# Patient Record
Sex: MALE | Race: WHITE | Employment: FULL TIME | URBAN - METROPOLITAN AREA
[De-identification: names, ages, dates, MRNs, and addresses within clinical notes are randomized per-mention and may not be internally consistent; named-entity substitution may affect disease eponyms.]

---

## 2017-12-12 ENCOUNTER — OFFICE VISIT (OUTPATIENT)
Dept: OPHTHALMOLOGY | Facility: CLINIC | Age: 62
End: 2017-12-12
Attending: OPHTHALMOLOGY
Payer: COMMERCIAL

## 2017-12-12 DIAGNOSIS — H52.11 HIGH MYOPIA, RIGHT: Primary | ICD-10-CM

## 2017-12-12 DIAGNOSIS — H44.522 PHTHISIS BULBI, LEFT EYE: ICD-10-CM

## 2017-12-12 DIAGNOSIS — H35.411 LATTICE DEGENERATION OF PERIPHERAL RETINA, RIGHT: ICD-10-CM

## 2017-12-12 PROCEDURE — 99213 OFFICE O/P EST LOW 20 MIN: CPT | Mod: ZF

## 2017-12-12 PROCEDURE — 92015 DETERMINE REFRACTIVE STATE: CPT | Mod: 52,ZF

## 2017-12-12 ASSESSMENT — REFRACTION_MANIFEST
OD_CYLINDER: +1.00
OD_SPHERE: -10.00
OD_ADD: +2.50
OS_SPHERE: BALANCE
OD_AXIS: 005

## 2017-12-12 ASSESSMENT — SLIT LAMP EXAM - LIDS
COMMENTS: NORMAL
COMMENTS: GOOD LID POSITION

## 2017-12-12 ASSESSMENT — VISUAL ACUITY
CORRECTION_TYPE: GLASSES
OD_CC: 20/20
OS_CC: PROS
OD_CC+: +2
METHOD: SNELLEN - LINEAR

## 2017-12-12 ASSESSMENT — TONOMETRY
IOP_METHOD: TONOPEN
OS_IOP_MMHG: PROS
OD_IOP_MMHG: 16

## 2017-12-12 ASSESSMENT — REFRACTION_WEARINGRX
OS_AXIS: 007
SPECS_TYPE: PAL
OS_CYLINDER: +0.75
OD_AXIS: 176
OD_CYLINDER: +0.75
OD_SPHERE: -9.75
OS_SPHERE: -9.50
OD_ADD: +2.50
OS_ADD: +2.50

## 2017-12-12 ASSESSMENT — EXTERNAL EXAM - RIGHT EYE: OD_EXAM: BROW PTOSIS

## 2017-12-12 ASSESSMENT — CONF VISUAL FIELD
OS_INFERIOR_NASAL_RESTRICTION: 1
OS_SUPERIOR_NASAL_RESTRICTION: 1
OS_INFERIOR_TEMPORAL_RESTRICTION: 1
OD_NORMAL: 1
OS_SUPERIOR_TEMPORAL_RESTRICTION: 1

## 2017-12-12 ASSESSMENT — CUP TO DISC RATIO: OD_RATIO: 0.4

## 2017-12-12 ASSESSMENT — EXTERNAL EXAM - LEFT EYE: OS_EXAM: ENOPHTHALMOS
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
## 2017-12-12 NOTE — MR AVS SNAPSHOT
After Visit Summary   12/12/2017    Elliott Caruso    MRN: [id number]           Patient Information     Date Of Birth          1955        Visit Information        Provider Department      12/12/2017 1:30 PM T[facility]quez [facility]        Today's Diagnoses     High myopia, right    -  1    Lattice degeneration of peripheral retina, right        Phthisis bulbi, left eye           Follow-ups after your visit        Follow-up notes from your care team     Return in about 1 year (around 12/12/2018) for DFE.      Who to contact     Please call your clinic at [phone] to:    Ask questions about your health    Make or cancel appointments    Discuss your medicines    Learn about your test results    Speak to your doctor   If you have compliments or concerns about an experience at your clinic, or if you wish to file a complaint, please contact [facility] Patient Relations at [phone] or email us at [email or web address]         Additional Information About Your Visit        MyChart Information     [employer] gives you secure access to your electronic health record. If you see a primary care provider, you can also send messages to your care team and make appointments. If you have questions, please call your primary care clinic.  If you do not have a primary care provider, please call [phone] and they will assist you.      [employer] is an electronic gateway that provides easy, online access to your medical records. With [employer], you can request a clinic appointment, read your test results, renew a prescription or communicate with your care team.     To access your existing account, please contact your [facility] or call [phone] for assistance.        Care EveryWhere ID     This is your Care EveryWhere ID. This could be used by other organizations to access your [facility] medical records  [id number]          Blood Pressure from Last 3 Encounters:   No data found for BP    Weight from Last 3 Encounters:   No data found for Wt              Today, you had the following     No orders found for display       Primary Care Provider Office Phone # Fax #    Rajiv Mcdonald [phone]       [facility] [place] [address] [place] [address]        Equal Access to Services     [facility]: Hadii aad ku hadasho Soomaali, waaxda luqadaha, qaybta kaalmada adeegyada, waxay idiin hayaan adeeg kharash la'aan . So [facility] [phone].    ATENCIÓN: Si habla español, tiene a momin disposición servicios gratuitos de asistencia lingüística. Llame al [phone].    We comply with applicable federal civil rights laws and [place] laws. We do not discriminate on the basis of race, color, national origin, age, disability, sex, sexual orientation, or gender identity.            Thank you!     Thank you for choosing [facility]  for your care. Our goal is always to provide you with excellent care. Hearing back from our patients is one way we can continue to improve our services. Please take a few minutes to complete the written survey that you may receive in the mail after your visit with us. Thank you!             Your Updated Medication List - Protect others around you: Learn how to safely use, store and throw away your medicines at www.disposemymeds.org.          This list is accurate as of: 12/12/17  3:18 PM.  Always use your most recent med list.                   Brand Name Dispense Instructions for use Diagnosis    INDOMETHACIN PO      Take by mouth as needed  for Gout

## 2017-12-12 NOTE — NURSING NOTE
"Chief Complaints and History of Present Illnesses   Patient presents with     Follow Up For     annual eye exam; high myopia, right eye; prosthetic left eye     HPI    Affected eye(s):  Right   Symptoms:     No decreased vision   Floaters   No flashes (Comment: only sporadically noted; stable)      Duration:  1 year   Frequency:  Constant       Do you have eye pain now?:  No      Comments:  Annual f/u high myopia for the right eye; prosthetic left eye - has prosthesis out for the exam  Pt states vision stable since last year    Pt occasionally uses an equate eye drop (believes the ones that \"get the red out\")    Sindy Lester COA 1:54 PM December 12, 2017              "

## 2018-12-11 ENCOUNTER — OFFICE VISIT (OUTPATIENT)
Dept: OPHTHALMOLOGY | Facility: CLINIC | Age: 63
End: 2018-12-11
Attending: OPHTHALMOLOGY
Payer: COMMERCIAL

## 2018-12-11 DIAGNOSIS — H52.11 HIGH MYOPIA, RIGHT: Primary | ICD-10-CM

## 2018-12-11 DIAGNOSIS — H44.522 PHTHISIS BULBI, LEFT EYE: ICD-10-CM

## 2018-12-11 DIAGNOSIS — H35.411 LATTICE DEGENERATION OF PERIPHERAL RETINA, RIGHT: ICD-10-CM

## 2018-12-11 PROCEDURE — G0463 HOSPITAL OUTPT CLINIC VISIT: HCPCS | Mod: ZF

## 2018-12-11 ASSESSMENT — REFRACTION_WEARINGRX
SPECS_TYPE: PAL
OD_CYLINDER: +0.75
OD_AXIS: 176
OD_ADD: +2.50
OS_AXIS: 007
OS_CYLINDER: +0.75
OS_SPHERE: -9.50
OS_ADD: +2.50
OD_SPHERE: -9.75

## 2018-12-11 ASSESSMENT — VISUAL ACUITY
OD_CC: 20/20
METHOD: SNELLEN - LINEAR
METHOD_MR: PT. DEFERS
CORRECTION_TYPE: GLASSES
OD_CC+: +2
OS_CC: PROS.

## 2018-12-11 ASSESSMENT — EXTERNAL EXAM - RIGHT EYE: OD_EXAM: BROW PTOSIS

## 2018-12-11 ASSESSMENT — CONF VISUAL FIELD
OS_SUPERIOR_TEMPORAL_RESTRICTION: 1
OD_NORMAL: 1
OS_INFERIOR_NASAL_RESTRICTION: 1
OS_SUPERIOR_NASAL_RESTRICTION: 1
OS_INFERIOR_TEMPORAL_RESTRICTION: 1

## 2018-12-11 ASSESSMENT — TONOMETRY
OS_IOP_MMHG: PROS
IOP_METHOD: TONOPEN
OD_IOP_MMHG: 15

## 2018-12-11 ASSESSMENT — SLIT LAMP EXAM - LIDS
COMMENTS: NORMAL
COMMENTS: GOOD LID POSITION

## 2018-12-11 ASSESSMENT — REFRACTION_MANIFEST: OS_SPHERE: BALANCE

## 2018-12-11 ASSESSMENT — EXTERNAL EXAM - LEFT EYE: OS_EXAM: ENOPHTHALMOS

## 2018-12-11 ASSESSMENT — CUP TO DISC RATIO: OD_RATIO: 0.4

## 2018-12-11 NOTE — NURSING NOTE
Chief Complaints and History of Present Illnesses   Patient presents with     Annual Eye Exam

## 2018-12-11 NOTE — PROGRESS NOTES
Chief Complaint(s) and History of Present Illness(es)     Pt. States that he is doing well.  No change in VA RE.  No pain BE.  Macy Bradley COT 1:10 PM December 11, 2018         Review of systems for the eyes was negative other than the pertinent positives/negatives listed in the HPI.      Assessment & Plan      Elliott Caruso is a 63 year old male with the following diagnoses:   1. High myopia, right    2. Lattice degeneration of peripheral retina, right    3. Phthisis bulbi, left eye         Doing well, no change in vision   Stable/healthy dilated fundus exam right eye   Discussed symptoms of retinal tear/detachment and the need to be seen urgently should they occur     Prosthesis comfortable, orbit looks good  Monocular precautions reviewed      Patient disposition:   Return in about 1 year (around 12/11/2019) for DFE.          Attending Physician Attestation:  Complete documentation of historical and exam elements from today's encounter can be found in the full encounter summary report (not reduplicated in this progress note).  I personally obtained the chief complaint(s) and history of present illness.  I confirmed and edited as necessary the review of systems, past medical/surgical history, family history, social history, and examination findings as documented by others; and I examined the patient myself.  I personally reviewed the relevant tests, images, and reports as documented above.  I formulated and edited as necessary the assessment and plan and discussed the findings and management plan with the patient and family. . - Tylor Henriquez MD

## 2019-10-05 ENCOUNTER — HEALTH MAINTENANCE LETTER (OUTPATIENT)
Age: 64
End: 2019-10-05

## 2020-02-10 ENCOUNTER — HEALTH MAINTENANCE LETTER (OUTPATIENT)
Age: 65
End: 2020-02-10

## 2020-10-01 ENCOUNTER — OFFICE VISIT (OUTPATIENT)
Dept: OPHTHALMOLOGY | Facility: CLINIC | Age: 65
End: 2020-10-01
Attending: OPHTHALMOLOGY
Payer: COMMERCIAL

## 2020-10-01 DIAGNOSIS — H52.11 HIGH MYOPIA, RIGHT: ICD-10-CM

## 2020-10-01 DIAGNOSIS — H25.11 SENILE NUCLEAR SCLEROSIS, RIGHT: ICD-10-CM

## 2020-10-01 DIAGNOSIS — H44.522 PHTHISIS BULBI, LEFT: Primary | ICD-10-CM

## 2020-10-01 PROCEDURE — 92012 INTRM OPH EXAM EST PATIENT: CPT | Performed by: OPHTHALMOLOGY

## 2020-10-01 PROCEDURE — G0463 HOSPITAL OUTPT CLINIC VISIT: HCPCS

## 2020-10-01 RX ORDER — MULTIPLE VITAMINS W/ MINERALS TAB 9MG-400MCG
1 TAB ORAL DAILY
COMMUNITY

## 2020-10-01 ASSESSMENT — REFRACTION_MANIFEST
OD_SPHERE: -10.50
OD_ADD: +2.50
OD_CYLINDER: +1.00
OD_AXIS: 015

## 2020-10-01 ASSESSMENT — SLIT LAMP EXAM - LIDS
COMMENTS: GOOD LID POSITION
COMMENTS: NORMAL

## 2020-10-01 ASSESSMENT — CUP TO DISC RATIO: OD_RATIO: 0.4

## 2020-10-01 ASSESSMENT — CONF VISUAL FIELD
OS_SUPERIOR_NASAL_RESTRICTION: 1
OS_INFERIOR_NASAL_RESTRICTION: 1
OS_INFERIOR_TEMPORAL_RESTRICTION: 1
OS_SUPERIOR_TEMPORAL_RESTRICTION: 1
OD_NORMAL: 1

## 2020-10-01 ASSESSMENT — VISUAL ACUITY
OD_CC+: -2
OS_CC: PROS
OD_CC: 20/30
METHOD: SNELLEN - LINEAR

## 2020-10-01 ASSESSMENT — TONOMETRY
IOP_METHOD: TONOPEN
OD_IOP_MMHG: 15

## 2020-10-01 ASSESSMENT — REFRACTION_WEARINGRX
OD_ADD: +2.50
SPECS_TYPE: PAL
OD_SPHERE: -10.00
OD_AXIS: 005
OD_CYLINDER: +1.00
OS_SPHERE: BALANCE

## 2020-10-01 ASSESSMENT — EXTERNAL EXAM - LEFT EYE: OS_EXAM: ENOPHTHALMOS

## 2020-10-01 ASSESSMENT — EXTERNAL EXAM - RIGHT EYE: OD_EXAM: BROW PTOSIS

## 2020-10-01 NOTE — PROGRESS NOTES
Chief Complaint(s) and History of Present Illness(es)     Follow Up     Laterality: High myopia, right  Lattice degeneration of peripheral retina, right  Phthisis bulbi, left eye      Onset: Last visit 12/11/18    Associated symptoms: floaters.  Negative for flashes, burning and   discharge    Treatments tried: no treatments    Pain scale: 0/10              Comments     Marta Casey, COT COT 9:52 AM October 1, 2020               Review of systems for the eyes was negative other than the pertinent positives/negatives listed in the HPI.      Assessment & Plan      Elliott Caruso is a 65 year old male with the following diagnoses:   1. Phthisis bulbi, left    2. Senile nuclear sclerosis, right    3. High myopia, right         Here for dilated fundus exam.  Minimal vision change.  [facility] with Dr. Thurman  Stable/healthy dilated fundus exam right eye   Cataract not visually significant   Refractive options reviewed  Refraction given   Discussed symptoms of retinal tear/detachment and the need to be seen urgently should they occur     Prosthesis comfortable, orbit looks good  Monocular precautions reviewed        Patient disposition:   Return in about 1 year (around 10/1/2021) for DFE.           Attending Physician Attestation:  Complete documentation of historical and exam elements from today's encounter can be found in the full encounter summary report (not reduplicated in this progress note).  I personally obtained the chief complaint(s) and history of present illness.  I confirmed and edited as necessary the review of systems, past medical/surgical history, family history, social history, and examination findings as documented by others; and I examined the patient myself.  I personally reviewed the relevant tests, images, and reports as documented above.  I formulated and edited as necessary the assessment and plan and discussed the findings and management plan with the patient and  family. . - Tylor Henriquez MD

## 2020-11-02 ENCOUNTER — MYC MEDICAL ADVICE (OUTPATIENT)
Dept: OPHTHALMOLOGY | Facility: CLINIC | Age: 65
End: 2020-11-02

## 2020-11-03 ENCOUNTER — OFFICE VISIT (OUTPATIENT)
Dept: OPHTHALMOLOGY | Facility: CLINIC | Age: 65
End: 2020-11-03
Attending: OPHTHALMOLOGY
Payer: COMMERCIAL

## 2020-11-03 DIAGNOSIS — H52.11 HIGH MYOPIA, RIGHT: Primary | ICD-10-CM

## 2020-11-03 PROCEDURE — 99207 PR NO BILLABLE SERVICE THIS VISIT: CPT

## 2020-11-03 PROCEDURE — 999N000103 HC STATISTIC NO CHARGE FACILITY FEE

## 2020-11-03 PROCEDURE — 92015 DETERMINE REFRACTIVE STATE: CPT

## 2020-11-03 ASSESSMENT — CONF VISUAL FIELD
OS_INFERIOR_NASAL_RESTRICTION: 1
OS_INFERIOR_TEMPORAL_RESTRICTION: 1
OD_NORMAL: 1
OS_SUPERIOR_NASAL_RESTRICTION: 1
OS_SUPERIOR_TEMPORAL_RESTRICTION: 1

## 2020-11-03 ASSESSMENT — REFRACTION_WEARINGRX
OD_CYLINDER: +1.00
SPECS_TYPE: PAL
OS_SPHERE: BALANCE
OD_AXIS: 019
OD_ADD: +2.50
OD_SPHERE: -10.50

## 2020-11-03 ASSESSMENT — VISUAL ACUITY
METHOD: SNELLEN - LINEAR
CORRECTION_TYPE: GLASSES
OS_CC: PROSTHETIC
OD_CC: 20/25

## 2020-11-03 ASSESSMENT — REFRACTION_MANIFEST
OS_SPHERE: BALANCE
OD_SPHERE: -10.25
OS_ADD: +2.75
OD_AXIS: 180
OD_CYLINDER: +1.25
OD_ADD: +2.75

## 2020-11-03 NOTE — NURSING NOTE
Chief Complaints and History of Present Illnesses   Patient presents with     Follow Up     Glasses recheck.     Chief Complaint(s) and History of Present Illness(es)     Follow Up     Comments: Glasses recheck.              Comments     Pt states vision is not as clear with new glasses. Pt having difficulty reading emails and seeing the television.  Pt tried new glasses for 4 days, but does not like them.    BEA Calixto November 3, 2020 7:49 AM

## 2020-11-03 NOTE — PROGRESS NOTES
Tech visit for prescription only, not seen by physician.      Tylor Henriquez MD  , Comprehensive Ophthalmology  Department of Ophthalmology and Visual Neurosciences  [facility]

## 2021-03-28 ENCOUNTER — HEALTH MAINTENANCE LETTER (OUTPATIENT)
Age: 66
End: 2021-03-28

## 2021-09-12 ENCOUNTER — HEALTH MAINTENANCE LETTER (OUTPATIENT)
Age: 66
End: 2021-09-12

## 2022-04-24 ENCOUNTER — HEALTH MAINTENANCE LETTER (OUTPATIENT)
Age: 67
End: 2022-04-24

## 2022-11-19 ENCOUNTER — HEALTH MAINTENANCE LETTER (OUTPATIENT)
Age: 67
End: 2022-11-19

## 2023-06-01 ENCOUNTER — HEALTH MAINTENANCE LETTER (OUTPATIENT)
Age: 68
End: 2023-06-01

## 2024-06-15 ENCOUNTER — HEALTH MAINTENANCE LETTER (OUTPATIENT)
Age: 69
End: 2024-06-15